# Patient Record
Sex: FEMALE | Race: WHITE | Employment: STUDENT | URBAN - METROPOLITAN AREA
[De-identification: names, ages, dates, MRNs, and addresses within clinical notes are randomized per-mention and may not be internally consistent; named-entity substitution may affect disease eponyms.]

---
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
## 2023-05-16 ENCOUNTER — OFFICE VISIT (OUTPATIENT)
Dept: FAMILY MEDICINE CLINIC | Facility: CLINIC | Age: 18
End: 2023-05-16
Payer: COMMERCIAL

## 2023-05-16 VITALS
SYSTOLIC BLOOD PRESSURE: 110 MMHG | BODY MASS INDEX: 43.4 KG/M2 | DIASTOLIC BLOOD PRESSURE: 80 MMHG | HEIGHT: 69 IN | OXYGEN SATURATION: 99 % | RESPIRATION RATE: 16 BRPM | WEIGHT: 293 LBS | TEMPERATURE: 97 F | HEART RATE: 82 BPM

## 2023-05-16 DIAGNOSIS — H66.002 NON-RECURRENT ACUTE SUPPURATIVE OTITIS MEDIA OF LEFT EAR WITHOUT SPONTANEOUS RUPTURE OF TYMPANIC MEMBRANE: Primary | ICD-10-CM

## 2023-05-16 PROCEDURE — 99213 OFFICE O/P EST LOW 20 MIN: CPT | Performed by: NURSE PRACTITIONER

## 2023-05-16 PROCEDURE — 3008F BODY MASS INDEX DOCD: CPT | Performed by: NURSE PRACTITIONER

## 2023-05-16 PROCEDURE — 3074F SYST BP LT 130 MM HG: CPT | Performed by: NURSE PRACTITIONER

## 2023-05-16 PROCEDURE — 3079F DIAST BP 80-89 MM HG: CPT | Performed by: NURSE PRACTITIONER

## 2023-05-16 RX ORDER — AMOXICILLIN 875 MG/1
875 TABLET, COATED ORAL 2 TIMES DAILY
Qty: 20 TABLET | Refills: 0 | Status: SHIPPED | OUTPATIENT
Start: 2023-05-16 | End: 2023-05-26

## 2023-05-22 NOTE — PROGRESS NOTES
Progress  Summary    Pt has attended 5 visits in Physical Therapy. Subjective: The patient states she is at 55%. She did go sledding the other day and feels she has more pain around her knee.      Assessment: The patient has primarily subjective pain r [phone].     Sincerely,  Electronically signed by therapist: Milo Grace PT    Dx: Left knee patellar tendonitis, PF disorder         Authorized # of Visits:  8         Next MD visit: none scheduled  Fall Risk: standard         Precautions: n/a patella 20 reps      Bridge with blue band above knees 20 reps  Single leg balance airex pad x 5 right and left LE 15-20 seconds Lunge with same side reach medial pull with band 10 x 2 each Bridge 30 reps with isometric hip adduction      Clam shell 30 rep Tissue Cultured Epidermal Autograft Text: The defect edges were debeveled with a #15 scalpel blade.  Given the location of the defect, shape of the defect and the proximity to free margins a tissue cultured epidermal autograft was deemed most appropriate.  The graft was then trimmed to fit the size of the defect.  The graft was then placed in the primary defect and oriented appropriately.

## 2024-03-13 ENCOUNTER — OFFICE VISIT (OUTPATIENT)
Dept: FAMILY MEDICINE CLINIC | Facility: CLINIC | Age: 19
End: 2024-03-13
Payer: COMMERCIAL

## 2024-03-13 VITALS
SYSTOLIC BLOOD PRESSURE: 112 MMHG | OXYGEN SATURATION: 99 % | TEMPERATURE: 98 F | WEIGHT: 281 LBS | RESPIRATION RATE: 16 BRPM | BODY MASS INDEX: 42 KG/M2 | HEART RATE: 71 BPM | DIASTOLIC BLOOD PRESSURE: 82 MMHG

## 2024-03-13 DIAGNOSIS — H66.001 NON-RECURRENT ACUTE SUPPURATIVE OTITIS MEDIA OF RIGHT EAR WITHOUT SPONTANEOUS RUPTURE OF TYMPANIC MEMBRANE: Primary | ICD-10-CM

## 2024-03-13 DIAGNOSIS — J02.9 SORE THROAT: ICD-10-CM

## 2024-03-13 DIAGNOSIS — R09.82 POST-NASAL DRIP: ICD-10-CM

## 2024-03-13 LAB
CONTROL LINE PRESENT WITH A CLEAR BACKGROUND (YES/NO): YES YES/NO
KIT LOT #: NORMAL NUMERIC
STREP GRP A CUL-SCR: NEGATIVE

## 2024-03-13 PROCEDURE — 99213 OFFICE O/P EST LOW 20 MIN: CPT

## 2024-03-13 PROCEDURE — 87880 STREP A ASSAY W/OPTIC: CPT

## 2024-03-13 RX ORDER — AMOXICILLIN 875 MG/1
875 TABLET, COATED ORAL 2 TIMES DAILY
Qty: 20 TABLET | Refills: 0 | Status: SHIPPED | OUTPATIENT
Start: 2024-03-13 | End: 2024-03-23

## 2024-03-13 NOTE — PROGRESS NOTES
CHIEF COMPLAINT:     Chief Complaint   Patient presents with    Sore Throat     S/s for 1 day and R>L ear pain 1 week       HPI:   Rubia Sanders is a 18 year old female who presents for upper respiratory symptoms for  1 weeks. Patient reports sore throat, ear pain, denies fever. Symptoms have been without change since onset.  Treating symptoms with zyrtec on 03/12. Reports ear pain for 1 week and sore throat for 1 day. Denies any known exposures or other members in house sick with similar symptoms.      Current Outpatient Medications   Medication Sig Dispense Refill    metFORMIN  MG Oral Tablet 24 Hr Take 2 tablets (1,000 mg total) by mouth daily. 60 tablet 5    sertraline (ZOLOFT) 50 MG Oral Tab Take 1 tablet (50 mg total) by mouth daily. 90 tablet 0    meclizine 12.5 MG Oral Tab Take 2 tablets (25 mg total) by mouth 3 (three) times daily as needed for Dizziness. 30 tablet 0    VITAMIN D, CHOLECALCIFEROL, OR Take by mouth.          Past Medical History:   Diagnosis Date    Allergic rhinitis       Past Surgical History:   Procedure Laterality Date    TONSILLECTOMY           Social History     Socioeconomic History    Marital status: Single   Tobacco Use    Smoking status: Never    Smokeless tobacco: Never   Vaping Use    Vaping Use: Never used   Substance and Sexual Activity    Alcohol use: No    Drug use: No    Sexual activity: Never   Other Topics Concern    Caffeine Concern Yes     Comment: occ cup of coffee    Exercise No         REVIEW OF SYSTEMS:   GENERAL: no change in appetite  SKIN: no rashes or abnormal skin lesions  HEENT: See HPI  LUNGS: See HPI  CARDIOVASCULAR: denies chest pain or palpitations   GI: denies N/V/C or abdominal pain      EXAM:   /82   Pulse 71   Temp 97.8 °F (36.6 °C) (Oral)   Resp 16   Wt 281 lb (127.5 kg)   LMP 02/19/2024   SpO2 99%   BMI 41.50 kg/m²   GENERAL: well developed, well nourished,in no apparent distress  SKIN: no rashes,no suspicious lesions  HEAD:  atraumatic, normocephalic.  no tenderness on palpation of frontal and maxillary sinuses  EYES: conjunctiva clear, EOM intact  EARS: TM's right erythematous and intact, no bulging, no retraction,right positive fluid, bony landmarks visualized  NOSE: Nostrils patent, no nasal discharge, nasal mucosa non-inflamed   THROAT: Oral mucosa pink, moist. Posterior pharynx is non erythematous. no exudates. Tonsils absent.    NECK: Supple, non-tender  LUNGS: clear to auscultation bilaterally, no wheezes or rhonchi. Breathing is non labored.  CARDIO: RRR without murmur  EXTREMITIES: no cyanosis, clubbing or edema  LYMPH:  no lymphadenopathy.        ASSESSMENT AND PLAN:   Rubia Sanders is a 18 year old female who presents with upper respiratory symptoms that are consistent with    ASSESSMENT:   Encounter Diagnoses   Name Primary?    Sore throat     Non-recurrent acute suppurative otitis media of right ear without spontaneous rupture of tympanic membrane Yes    Post-nasal drip      Results for orders placed or performed in visit on 03/13/24   Rapid Strep    Collection Time: 03/13/24  3:46 PM   Result Value Ref Range    Strep Grp A Screen negative Negative    Control Line Present with a clear background (yes/no) yes Yes/No    Kit Lot # [id number] Numeric    Kit Expiration Date 05/21/2025 Date         PLAN: Meds as below.  Comfort care as described in Patient Instructions. Discussion about supportive treatment including over the counter medications, hydration and rest.     Meds & Refills for this Visit:  Requested Prescriptions     Signed Prescriptions Disp Refills    amoxicillin 875 MG Oral Tab 20 tablet 0     Sig: Take 1 tablet (875 mg total) by mouth 2 (two) times daily for 10 days.     Risks, benefits, and side effects of medication explained and discussed.    The patient indicates understanding of these issues and agrees to the plan.  The patient is asked to f/u with PCP if sx's persist or worsen.

## 2024-10-02 NOTE — PROGRESS NOTES
HPI:   Rubia Sanders is a 18 year old female who presents for a complete physical exam. Symptoms: denies discharge, itching, burning or dysuria, flow is 5-7 days, menses irregular .  Regular SBE- no,Sexually active- no,  Contraception- none. STD history- none.    Pt is here for a recheck of anxiety/depression. Pt stopped the medication last year sometime because she did not feel it was working well for her. 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 Pt states her mind is always thinking about work. Pt can not sleep at night because she is thinking about work. Pt also gets texts night and day from the other managers about different things and it drives her crazy. [employer] is all about communication but she feels they communicate too much. This makes her sad and then she has thoughts of death. Pt states she would not act on her thoughts. Pt feels she is having them because she is stressed out. Pt is considering changing jobs. Pt would like to try a different medication and see a counselor.     Screening:  Immunization History   Administered Date(s) Administered    Covid-19 Vaccine Pfizer 30 mcg/0.3 ml 06/16/2021, 07/07/2021    Covid-19 Vaccine Pfizer Thomas-Sucrose 30 mcg/0.3 ml 01/20/2022    DTAP 05/07/2004, 08/18/2004, 10/14/2004, 08/15/2005    FLULAVAL 6 months & older 0.5 ml Prefilled syringe ([id number]) 11/07/2022    HEP A,Ped/Adol,(2 Dose) 07/31/2018, 02/20/2019    HEP B, Ped/Adol 05/07/2004, 08/18/2004, 02/02/2005    HIB 05/07/2004, 08/18/2004, 10/14/2004, 06/27/2005    Hpv Virus Vaccine 9 Jadyn Im 07/31/2018, 02/20/2019    IPV 05/07/2004, 08/18/2004, 08/15/2005    Influenza 01/28/2018    MMR 06/27/2005, 07/29/2015    Meningococcal-Menveo 2month-55yr 07/29/2015, 08/10/2021    Pneumococcal (Prevnar 13) 10/14/2004, 06/27/2005    Pneumococcal (Prevnar 7) 05/07/2004, 08/18/2004    TDAP 07/29/2015    Varicella 02/02/2005, 07/29/2015      Menarche- 9 yr  PAP-  none  Any abnormal pap smears- n/a  Pregnancies- 0, Live births- 0  Mammogram-  n/a   Any breast cancer- maternal aunt, or any gynecological cancer- none, any cancers none  Labs- 9/21/19  DEXA over 65yr- n/a  Flu shot-  today  Colon- n/a  Glasses/contacts- yes, last exam- 2023  Dental visits- 2/2024    Immunization History   Administered Date(s) Administered    Covid-19 Vaccine Pfizer 30 mcg/0.3 ml 06/16/2021, 07/07/2021    Covid-19 Vaccine Pfizer Thomas-Sucrose 30 mcg/0.3 ml 01/20/2022    DTAP 05/07/2004, 08/18/2004, 10/14/2004, 08/15/2005    FLULAVAL 6 months & older 0.5 ml Prefilled syringe ([id number]) 11/07/2022    HEP A,Ped/Adol,(2 Dose) 07/31/2018, 02/20/2019    HEP B, Ped/Adol 05/07/2004, 08/18/2004, 02/02/2005    HIB 05/07/2004, 08/18/2004, 10/14/2004, 06/27/2005    Hpv Virus Vaccine 9 Jadyn Im 07/31/2018, 02/20/2019    IPV 05/07/2004, 08/18/2004, 08/15/2005    Influenza 01/28/2018    MMR 06/27/2005, 07/29/2015    Meningococcal-Menveo 2month-55yr 07/29/2015, 08/10/2021    Pneumococcal (Prevnar 13) 10/14/2004, 06/27/2005    Pneumococcal (Prevnar 7) 05/07/2004, 08/18/2004    TDAP 07/29/2015    Varicella 02/02/2005, 07/29/2015     Wt Readings from Last 6 Encounters:   10/03/24 287 lb 3.2 oz (130.3 kg)   03/13/24 281 lb (127.5 kg)   05/16/23 (!) 350 lb (158.8 kg) (>99%, Z= 3.02)*   11/07/22 279 lb (126.6 kg) (>99%, Z= 2.66)*   08/30/22 271 lb (122.9 kg) (>99%, Z= 2.60)*   04/26/22 275 lb 9.6 oz (125 kg) (>99%, Z= 2.61)*     * Growth percentiles are based on CDC (Girls, 2-20 Years) data.     Body mass index is 42.41 kg/m².     Lab Results   Component Value Date    GLU 84 01/06/2021    GLU 84 09/21/2019     Lab Results   Component Value Date    CHOLEST 163 09/21/2019     Lab Results   Component Value Date    HDL 50 09/21/2019     Lab Results   Component Value Date     (H) 09/21/2019     Lab Results   Component Value Date    AST 41 (H) 01/06/2021    AST 22 09/21/2019     Lab Results   Component Value Date    ALT 58  (H) 01/06/2021    ALT 37 09/21/2019       Current Outpatient Medications   Medication Sig Dispense Refill    Vitamin D, Cholecalciferol, 50 MCG (2000 UT) Oral Cap Take 2,000 Units by mouth.        Past Medical History:    Allergic rhinitis      Past Surgical History:   Procedure Laterality Date    Tonsillectomy        History reviewed. No pertinent family history.   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   Exercise: minimal.  Diet: watches minimally     REVIEW OF SYSTEMS:   GENERAL: feels well otherwise  SKIN: denies any unusual skin lesions  EYES:denies blurred vision or double vision  HEENT: denies nasal congestion, sinus pain or ST  LUNGS: denies shortness of breath with exertion  CARDIOVASCULAR: denies chest pain on exertion  GI: denies abdominal pain,denies heartburn  : denies dysuria, vaginal discharge or itching,periods regular   MUSCULOSKELETAL: denies back pain, hx of left knee pain  NEURO: denies headaches  PSYCHE: + depression and anxiety. See HPI  HEMATOLOGIC: denies hx of anemia  ENDOCRINE: denies thyroid history  ALL/ASTHMA: denies hx of allergy or asthma    EXAM:   /80   Pulse 82   Temp 97.3 °F (36.3 °C) (Temporal)   Resp 16   Ht 5' 9\" (1.753 m)   Wt 287 lb 3.2 oz (130.3 kg)   LMP 10/01/2024   SpO2 99%   BMI 42.41 kg/m²   Body mass index is 42.41 kg/m².   GENERAL: well developed, well nourished,in no apparent distress  SKIN: no rashes,no suspicious lesions  HEENT: atraumatic, normocephalic,ears and throat are clear  EYES:PERRLA, EOMI, normal optic disk,conjunctiva are clear  NECK: supple,no  adenopathy,no bruits  CHEST: no chest tenderness  BREAST: no dominant or suspicious mass  LUNGS: clear to auscultation  CARDIO: RRR without murmur  GI: good BS's,no masses, HSM or tenderness  :deferred not sexually active  MUSCULOSKELETAL: back is not tender,FROM of the back  EXTREMITIES: no cyanosis, clubbing or edema  NEURO: Oriented times three,cranial nerves are intact,motor and sensory are grossly intact  Psych: Alert, oriented, normal affect without distress. Good eye contact. Appropriate conversational responses to questions. Appropriate dress and body language. Normal judgment and insight. No pressured speech, flight of ideas, hyperactivity or psychomotor hyperactivity or retardation. Denies suicidal ideation. No evidence of hallucinations, delusions or psychosis.     ASSESSMENT AND PLAN:   Rubia Sanders is a 18 year old female who presents for a complete physical exam.  Self breast exam explained. Health maintenance, will check fasting Labs. Pt' s weight is Body mass index is 42.41 kg/m²., recommended low fat diet and aerobic exercise 30 minutes three times weekly.  The patient indicates understanding of these issues and agrees to the plan.  The patient is asked to return for CPX in one year.  1. Routine general medical examination at a health care facility    - CBC With Differential With Platelet; Future  - Comp Metabolic Panel (14); Future  - Lipid Panel; Future  - Hemoglobin A1C; Future  - TSH W Reflex To Free T4; Future  - Vitamin D; Future    2. Anxiety and depression  - Part to start the Lexapro and call with a progress report in 2 weeks,,sooner if needed. Pt also referred to counseling  - escitalopram (LEXAPRO) 10 MG Oral Tab; Take 1 tablet (10 mg total) by mouth daily.  Dispense: 90 tablet; Refill: 0  - [facility] Referral - In Network    3. Need for influenza vaccination    - INFLUENZA VACCINE, TRI, PRESERV FREE, 0.5 ML    Encounter Diagnoses   Name Primary?    Routine general medical  examination at a health care facility Yes    Anxiety and depression        Orders Placed This Encounter   Procedures    CBC With Differential With Platelet    Comp Metabolic Panel (14)    Lipid Panel    Hemoglobin A1C    TSH W Reflex To Free T4    Vitamin D    INFLUENZA VACCINE, TRI, PRESERV FREE, 0.5 ML       Meds & Refills for this Visit:  Requested Prescriptions     Signed Prescriptions Disp Refills    escitalopram (LEXAPRO) 10 MG Oral Tab 90 tablet 0     Sig: Take 1 tablet (10 mg total) by mouth daily.       Imaging & Consults:  INFLUENZA VACCINE, TRI, PRESERV FREE, 0.5 ML  OP REFERRAL TO [facility]

## 2024-10-03 ENCOUNTER — LAB ENCOUNTER (OUTPATIENT)
Dept: LAB | Age: 19
End: 2024-10-03
Attending: FAMILY MEDICINE
Payer: COMMERCIAL

## 2024-10-03 ENCOUNTER — OFFICE VISIT (OUTPATIENT)
Dept: INTERNAL MEDICINE CLINIC | Facility: CLINIC | Age: 19
End: 2024-10-03
Payer: COMMERCIAL

## 2024-10-03 VITALS
OXYGEN SATURATION: 99 % | RESPIRATION RATE: 16 BRPM | TEMPERATURE: 97 F | SYSTOLIC BLOOD PRESSURE: 110 MMHG | HEIGHT: 69 IN | WEIGHT: 287.19 LBS | DIASTOLIC BLOOD PRESSURE: 80 MMHG | HEART RATE: 82 BPM | BODY MASS INDEX: 42.54 KG/M2

## 2024-10-03 DIAGNOSIS — Z00.00 ROUTINE GENERAL MEDICAL EXAMINATION AT A HEALTH CARE FACILITY: ICD-10-CM

## 2024-10-03 DIAGNOSIS — F41.9 ANXIETY AND DEPRESSION: ICD-10-CM

## 2024-10-03 DIAGNOSIS — F32.A ANXIETY AND DEPRESSION: ICD-10-CM

## 2024-10-03 DIAGNOSIS — Z23 NEED FOR INFLUENZA VACCINATION: ICD-10-CM

## 2024-10-03 DIAGNOSIS — Z00.00 ROUTINE GENERAL MEDICAL EXAMINATION AT A HEALTH CARE FACILITY: Primary | ICD-10-CM

## 2024-10-03 LAB
ALBUMIN SERPL-MCNC: 4.4 G/DL (ref 3.2–4.8)
ALBUMIN/GLOB SERPL: 1.3 {RATIO} (ref 1–2)
ALP LIVER SERPL-CCNC: 111 U/L
ALT SERPL-CCNC: 20 U/L
ANION GAP SERPL CALC-SCNC: 4 MMOL/L (ref 0–18)
AST SERPL-CCNC: 25 U/L (ref ?–34)
BASOPHILS # BLD AUTO: 0.04 X10(3) UL (ref 0–0.2)
BASOPHILS NFR BLD AUTO: 0.7 %
BILIRUB SERPL-MCNC: 0.4 MG/DL (ref 0.3–1.2)
BUN BLD-MCNC: 12 MG/DL (ref 9–23)
CALCIUM BLD-MCNC: 10.2 MG/DL (ref 8.7–10.4)
CHLORIDE SERPL-SCNC: 109 MMOL/L (ref 98–112)
CHOLEST SERPL-MCNC: 157 MG/DL (ref ?–200)
CO2 SERPL-SCNC: 21 MMOL/L (ref 21–32)
CREAT BLD-MCNC: 0.65 MG/DL
EGFRCR SERPLBLD CKD-EPI 2021: 129 ML/MIN/1.73M2 (ref 60–?)
EOSINOPHIL # BLD AUTO: 0.04 X10(3) UL (ref 0–0.7)
EOSINOPHIL NFR BLD AUTO: 0.7 %
ERYTHROCYTE [DISTWIDTH] IN BLOOD BY AUTOMATED COUNT: 13 %
EST. AVERAGE GLUCOSE BLD GHB EST-MCNC: 111 MG/DL (ref 68–126)
FASTING PATIENT LIPID ANSWER: YES
FASTING STATUS PATIENT QL REPORTED: YES
GLOBULIN PLAS-MCNC: 3.3 G/DL (ref 2–3.5)
GLUCOSE BLD-MCNC: 86 MG/DL (ref 70–99)
HBA1C MFR BLD: 5.5 % (ref ?–5.7)
HCT VFR BLD AUTO: 38.8 %
HDLC SERPL-MCNC: 52 MG/DL (ref 40–59)
HGB BLD-MCNC: 12.5 G/DL
IMM GRANULOCYTES # BLD AUTO: 0.01 X10(3) UL (ref 0–1)
IMM GRANULOCYTES NFR BLD: 0.2 %
LDLC SERPL CALC-MCNC: 94 MG/DL (ref ?–100)
LYMPHOCYTES # BLD AUTO: 1.52 X10(3) UL (ref 1–4)
LYMPHOCYTES NFR BLD AUTO: 25.9 %
MCH RBC QN AUTO: 27.5 PG (ref 26–34)
MCHC RBC AUTO-ENTMCNC: 32.2 G/DL (ref 31–37)
MCV RBC AUTO: 85.5 FL
MONOCYTES # BLD AUTO: 0.41 X10(3) UL (ref 0.1–1)
MONOCYTES NFR BLD AUTO: 7 %
NEUTROPHILS # BLD AUTO: 3.86 X10 (3) UL (ref 1.5–7.7)
NEUTROPHILS # BLD AUTO: 3.86 X10(3) UL (ref 1.5–7.7)
NEUTROPHILS NFR BLD AUTO: 65.5 %
NONHDLC SERPL-MCNC: 105 MG/DL (ref ?–130)
OSMOLALITY SERPL CALC.SUM OF ELEC: 277 MOSM/KG (ref 275–295)
PLATELET # BLD AUTO: 233 10(3)UL (ref 150–450)
POTASSIUM SERPL-SCNC: 4 MMOL/L (ref 3.5–5.1)
PROT SERPL-MCNC: 7.7 G/DL (ref 5.7–8.2)
RBC # BLD AUTO: 4.54 X10(6)UL
SODIUM SERPL-SCNC: 134 MMOL/L (ref 136–145)
TRIGL SERPL-MCNC: 53 MG/DL (ref 30–149)
TSI SER-ACNC: 2.51 MIU/ML (ref 0.55–4.78)
VIT D+METAB SERPL-MCNC: 22.1 NG/ML (ref 30–100)
VLDLC SERPL CALC-MCNC: 9 MG/DL (ref 0–30)
WBC # BLD AUTO: 5.9 X10(3) UL (ref 4–11)

## 2024-10-03 PROCEDURE — 80053 COMPREHEN METABOLIC PANEL: CPT

## 2024-10-03 PROCEDURE — 90471 IMMUNIZATION ADMIN: CPT | Performed by: FAMILY MEDICINE

## 2024-10-03 PROCEDURE — 90656 IIV3 VACC NO PRSV 0.5 ML IM: CPT | Performed by: FAMILY MEDICINE

## 2024-10-03 PROCEDURE — 99395 PREV VISIT EST AGE 18-39: CPT | Performed by: FAMILY MEDICINE

## 2024-10-03 PROCEDURE — 82306 VITAMIN D 25 HYDROXY: CPT

## 2024-10-03 PROCEDURE — 80061 LIPID PANEL: CPT

## 2024-10-03 PROCEDURE — 99213 OFFICE O/P EST LOW 20 MIN: CPT | Performed by: FAMILY MEDICINE

## 2024-10-03 PROCEDURE — 36415 COLL VENOUS BLD VENIPUNCTURE: CPT

## 2024-10-03 PROCEDURE — 85025 COMPLETE CBC W/AUTO DIFF WBC: CPT

## 2024-10-03 PROCEDURE — 83036 HEMOGLOBIN GLYCOSYLATED A1C: CPT

## 2024-10-03 PROCEDURE — 84443 ASSAY THYROID STIM HORMONE: CPT

## 2024-10-03 RX ORDER — ESCITALOPRAM OXALATE 10 MG/1
10 TABLET ORAL DAILY
Qty: 90 TABLET | Refills: 0 | Status: SHIPPED | OUTPATIENT
Start: 2024-10-03

## 2024-10-21 ENCOUNTER — PATIENT MESSAGE (OUTPATIENT)
Dept: INTERNAL MEDICINE CLINIC | Facility: CLINIC | Age: 19
End: 2024-10-21

## 2024-12-27 DIAGNOSIS — F32.A ANXIETY AND DEPRESSION: ICD-10-CM

## 2024-12-27 DIAGNOSIS — F41.9 ANXIETY AND DEPRESSION: ICD-10-CM

## 2024-12-28 RX ORDER — ESCITALOPRAM OXALATE 10 MG/1
10 TABLET ORAL DAILY
Qty: 30 TABLET | Refills: 2 | Status: SHIPPED | OUTPATIENT
Start: 2024-12-28

## 2025-01-25 ENCOUNTER — HOSPITAL ENCOUNTER (EMERGENCY)
Age: 20
Discharge: HOME | End: 2025-01-25
Attending: EMERGENCY MEDICINE
Payer: COMMERCIAL

## 2025-01-25 VITALS
HEART RATE: 68 BPM | DIASTOLIC BLOOD PRESSURE: 54 MMHG | HEIGHT: 69 IN | OXYGEN SATURATION: 100 % | BODY MASS INDEX: 43.4 KG/M2 | RESPIRATION RATE: 16 BRPM | TEMPERATURE: 97 F | WEIGHT: 293 LBS | SYSTOLIC BLOOD PRESSURE: 108 MMHG

## 2025-01-25 DIAGNOSIS — R55 SYNCOPE, NEAR: Primary | ICD-10-CM

## 2025-01-25 LAB
ALBUMIN SERPL-MCNC: 5.1 G/DL (ref 3.2–4.8)
ALBUMIN/GLOB SERPL: 1.7 {RATIO} (ref 1–2)
ALP LIVER SERPL-CCNC: 113 U/L
ALT SERPL-CCNC: 23 U/L
ANION GAP SERPL CALC-SCNC: 7 MMOL/L (ref 0–18)
AST SERPL-CCNC: 23 U/L (ref ?–34)
B-HCG UR QL: NEGATIVE
BASOPHILS # BLD AUTO: 0.06 X10(3) UL (ref 0–0.2)
BASOPHILS NFR BLD AUTO: 0.8 %
BILIRUB SERPL-MCNC: 0.5 MG/DL (ref 0.3–1.2)
BUN BLD-MCNC: 10 MG/DL (ref 9–23)
CALCIUM BLD-MCNC: 10 MG/DL (ref 8.7–10.6)
CHLORIDE SERPL-SCNC: 109 MMOL/L (ref 98–112)
CO2 SERPL-SCNC: 24 MMOL/L (ref 21–32)
CREAT BLD-MCNC: 0.68 MG/DL
EGFRCR SERPLBLD CKD-EPI 2021: 127 ML/MIN/1.73M2 (ref 60–?)
EOSINOPHIL # BLD AUTO: 0.03 X10(3) UL (ref 0–0.7)
EOSINOPHIL NFR BLD AUTO: 0.4 %
ERYTHROCYTE [DISTWIDTH] IN BLOOD BY AUTOMATED COUNT: 12.9 %
GLOBULIN PLAS-MCNC: 3 G/DL (ref 2–3.5)
GLUCOSE BLD-MCNC: 96 MG/DL (ref 70–99)
HCT VFR BLD AUTO: 37.7 %
HGB BLD-MCNC: 13 G/DL
IMM GRANULOCYTES # BLD AUTO: 0.02 X10(3) UL (ref 0–1)
IMM GRANULOCYTES NFR BLD: 0.3 %
LYMPHOCYTES # BLD AUTO: 1.62 X10(3) UL (ref 1–4)
LYMPHOCYTES NFR BLD AUTO: 22.3 %
MCH RBC QN AUTO: 28.1 PG (ref 26–34)
MCHC RBC AUTO-ENTMCNC: 34.5 G/DL (ref 31–37)
MCV RBC AUTO: 81.6 FL
MONOCYTES # BLD AUTO: 0.41 X10(3) UL (ref 0.1–1)
MONOCYTES NFR BLD AUTO: 5.7 %
NEUTROPHILS # BLD AUTO: 5.11 X10 (3) UL (ref 1.5–7.7)
NEUTROPHILS # BLD AUTO: 5.11 X10(3) UL (ref 1.5–7.7)
NEUTROPHILS NFR BLD AUTO: 70.5 %
OSMOLALITY SERPL CALC.SUM OF ELEC: 289 MOSM/KG (ref 275–295)
PLATELET # BLD AUTO: 253 10(3)UL (ref 150–450)
POTASSIUM SERPL-SCNC: 3.7 MMOL/L (ref 3.5–5.1)
PROT SERPL-MCNC: 8.1 G/DL (ref 5.7–8.2)
RBC # BLD AUTO: 4.62 X10(6)UL
SODIUM SERPL-SCNC: 140 MMOL/L (ref 136–145)
TROPONIN I SERPL HS-MCNC: <3 NG/L
WBC # BLD AUTO: 7.3 X10(3) UL (ref 4–11)

## 2025-01-25 PROCEDURE — 93010 ELECTROCARDIOGRAM REPORT: CPT

## 2025-01-25 PROCEDURE — 80053 COMPREHEN METABOLIC PANEL: CPT | Performed by: EMERGENCY MEDICINE

## 2025-01-25 PROCEDURE — 93005 ELECTROCARDIOGRAM TRACING: CPT

## 2025-01-25 PROCEDURE — 85025 COMPLETE CBC W/AUTO DIFF WBC: CPT | Performed by: EMERGENCY MEDICINE

## 2025-01-25 PROCEDURE — 96360 HYDRATION IV INFUSION INIT: CPT

## 2025-01-25 PROCEDURE — 84484 ASSAY OF TROPONIN QUANT: CPT | Performed by: EMERGENCY MEDICINE

## 2025-01-25 PROCEDURE — 99284 EMERGENCY DEPT VISIT MOD MDM: CPT

## 2025-01-25 PROCEDURE — 81025 URINE PREGNANCY TEST: CPT

## 2025-01-25 NOTE — ED PROVIDER NOTES
Patient Seen in: ward Emergency Department In [place]      History     Chief Complaint   Patient presents with    Arrythmia/Palpitations     Stated Complaint: heart racing-     Subjective:   HPI      Patient is a 19-year-old female who had a near syncopal episode at work today.  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  Did not fall or lose consciousness.  Feels a little bit better now but still little weak.  She started on new medication Vraylar about a week and a half ago but has not had any symptoms like this until today.  She did eat and drink normally this morning and for lunch.  No recent illnesses.    Objective:     Past Medical History:    Allergic rhinitis    Anxiety    Depression              Past Surgical History:   Procedure Laterality Date    Tonsillectomy                  Social History     Socioeconomic History    Marital status: Single   Tobacco Use    Smoking status: Never    Smokeless tobacco: Never   Vaping Use    Vaping status: Never Used   Substance and Sexual Activity    Alcohol use: Yes     Comment: soc.    Drug use: No    Sexual activity: Never   Other Topics Concern    Caffeine Concern Yes     Comment: occ cup of coffee    Exercise No                  Physical Exam     ED Triage Vitals [01/25/25 1403]   /40   Pulse 81   Resp 16   Temp 97.2 °F (36.2 °C)   Temp src Temporal   SpO2 98 %   O2 Device None (Room air)       Current Vitals:   Vital Signs  BP: 101/65  Pulse: 106  Resp: 16  Temp: 97.2 °F (36.2 °C)  Temp src: Temporal    Oxygen Therapy  SpO2: 98 %  O2 Device: None (Room air)        Physical Exam  Vitals and nursing note reviewed.   Constitutional:       Appearance: She is well-developed.   HENT:      Head: Normocephalic and atraumatic.   Eyes:      Conjunctiva/sclera: Conjunctivae normal.      Pupils: Pupils are equal, round, and  reactive to light.   Cardiovascular:      Rate and Rhythm: Normal rate and regular rhythm.      Heart sounds: Normal heart sounds.   Pulmonary:      Effort: Pulmonary effort is normal.      Breath sounds: Normal breath sounds.   Abdominal:      General: Bowel sounds are normal.      Palpations: Abdomen is soft.   Musculoskeletal:         General: Normal range of motion.      Cervical back: Normal range of motion and neck supple.   Skin:     General: Skin is warm and dry.   Neurological:      Mental Status: She is alert and oriented to person, place, and time.             ED Course     Labs Reviewed   COMP METABOLIC PANEL (14) - Abnormal; Notable for the following components:       Result Value    Albumin 5.1 (*)     All other components within normal limits   TROPONIN I HIGH SENSITIVITY - Normal   POCT PREGNANCY URINE - Normal   CBC WITH DIFFERENTIAL WITH PLATELET   RAINBOW DRAW LAVENDER   RAINBOW DRAW LIGHT GREEN   RAINBOW DRAW BLUE     EKG    Rate, intervals and axes as noted on EKG Report.  Rate: 83  Rhythm: Sinus Rhythm  Reading: Sinus rhythm.  No ST segment or T wave changes.  No old available for comparison.                       MDM      19-year-old female had what sounds a presyncope at work today.  Sounds most like orthostatic hypotension with lightheadedness/dizziness, nausea.  Well-appearing here, states she still feels little weak but no distress.  EKG is unremarkable.  Labs ordered to evaluate for anemia, electrolyte abnormality, dehydration.  Will hydrate her.    Update at 3:10 PM.  Labs are unremarkable.  Patient comfortable in the room.  While she was not orthostatic her heart rate did go up significantly when she went from sitting to standing so we will finish IV fluids here but I think she can be discharged home after that.      Past Medical History-none    Differential diagnosis before testing included anemia, dehydration, electrolyte abnormality    Co-morbidities that add to the complexity of  management include: None    Testing ordered during this visit included labs        Disposition:        Discharge  I have discussed with the patient the results of test, differential diagnosis, treatment plan, warning signs and symptoms which should prompt immediate return.  They expressed understanding of these instructions and agrees to the following plan provided.  They were given written discharge instructions and agrees to return for any concerns and voiced understanding and all questions were answered.      Medical Decision Making      Disposition and Plan     Clinical Impression:  1. Syncope, near         Disposition:  Discharge  1/25/2025  3:17 pm    Follow-up:  Juan Luis Nassar MD  [address] [address]  [phone]    Follow up            Medications Prescribed:  Current Discharge Medication List              Supplementary Documentation:

## 2025-01-25 NOTE — ED INITIAL ASSESSMENT (HPI)
Felt heart racing and shaky while at work, felt like vomiting, feels a little nauseated now, no recent illness, started Vraylar on 1/15/25

## 2025-01-26 LAB
ATRIAL RATE: 83 BPM
P AXIS: -9 DEGREES
P-R INTERVAL: 128 MS
Q-T INTERVAL: 378 MS
QRS DURATION: 88 MS
QTC CALCULATION (BEZET): 444 MS
R AXIS: 57 DEGREES
T AXIS: 26 DEGREES
VENTRICULAR RATE: 83 BPM

## 2025-04-05 DIAGNOSIS — F32.A ANXIETY AND DEPRESSION: ICD-10-CM

## 2025-04-05 DIAGNOSIS — F41.9 ANXIETY AND DEPRESSION: ICD-10-CM

## 2025-04-05 DIAGNOSIS — R10.2 PELVIC PAIN: ICD-10-CM

## 2025-04-06 RX ORDER — ESCITALOPRAM OXALATE 10 MG/1
10 TABLET ORAL DAILY
Qty: 30 TABLET | Refills: 2 | Status: SHIPPED | OUTPATIENT
Start: 2025-04-06

## 2025-04-06 RX ORDER — NORETHINDRONE ACETATE AND ETHINYL ESTRADIOL 1.5-30(21)
1 KIT ORAL DAILY
Qty: 28 TABLET | Refills: 2 | Status: SHIPPED | OUTPATIENT
Start: 2025-04-06 | End: 2026-04-06

## 2025-06-26 DIAGNOSIS — R10.2 PELVIC PAIN: ICD-10-CM

## 2025-06-26 RX ORDER — NORETHINDRONE ACETATE AND ETHINYL ESTRADIOL AND FERROUS FUMARATE 1.5-30(21)
1 KIT ORAL DAILY
Qty: 84 TABLET | Refills: 0 | Status: SHIPPED | OUTPATIENT
Start: 2025-06-26

## 2025-06-26 NOTE — TELEPHONE ENCOUNTER
Loestrin FE    Gynecology Medication Protocol Failed 06/26/2025 12:09 AM   Protocol Details PASS-PENDING LAST PAP WNL--VIA MANUAL LOOKUP      Filled 4-6-25  Qty 28  2 refills  Future Appointments   Date Time Provider Department Center   7/21/2025  2:00 PM Suzette Sinclair MD [facility]   LOV 10-3-24 [facility]

## 2025-08-27 ENCOUNTER — TELEPHONE (OUTPATIENT)
Dept: INTERNAL MEDICINE CLINIC | Facility: CLINIC | Age: 20
End: 2025-08-27

## 2025-08-28 ENCOUNTER — OFFICE VISIT (OUTPATIENT)
Dept: INTERNAL MEDICINE CLINIC | Facility: CLINIC | Age: 20
End: 2025-08-28

## 2025-08-28 VITALS
HEART RATE: 88 BPM | RESPIRATION RATE: 18 BRPM | BODY MASS INDEX: 43.4 KG/M2 | WEIGHT: 293 LBS | OXYGEN SATURATION: 97 % | DIASTOLIC BLOOD PRESSURE: 78 MMHG | SYSTOLIC BLOOD PRESSURE: 118 MMHG | TEMPERATURE: 98 F | HEIGHT: 69 IN

## 2025-08-28 DIAGNOSIS — J01.90 ACUTE NON-RECURRENT SINUSITIS, UNSPECIFIED LOCATION: Primary | ICD-10-CM

## 2025-08-28 PROCEDURE — 99213 OFFICE O/P EST LOW 20 MIN: CPT | Performed by: FAMILY MEDICINE

## 2025-08-28 RX ORDER — METHYLPREDNISOLONE 4 MG/1
TABLET ORAL
Qty: 1 EACH | Refills: 0 | Status: SHIPPED | OUTPATIENT
Start: 2025-08-28

## (undated) DIAGNOSIS — S83.005A DISLOCATION OF LEFT PATELLA, INITIAL ENCOUNTER: Primary | ICD-10-CM

## (undated) NOTE — LETTER
Patient Name: Petros Stallings  YOB: 2005          MRN :  [id number]  Date:  3/22/2022  Referring Physician:

## (undated) NOTE — LETTER
Patient Name: Juan C Baron  YOB: 2005          MRN number:  [id number]  Date:  3/7/2018  Referring Physician:  Allan Rosario     Discharge Summary    Pt has attended 8 visits in Physical Therapy. Subjective:  The patient states she and follow up with physician if pain complaints do not fully resolve. Thank you for your referral. If you have any questions, please contact me at Dept: [phone].     Sincerely,  Electronically signed by therapist: John Mares PT

## (undated) NOTE — LETTER
Date: 1/11/2018    Patient Name: Andrea Moreau          To Whom it may concern: This letter has been written at the patient's request. The above patient was seen at the [facility] for treatment of a medical condition.     The patient may

## (undated) NOTE — LETTER
January 25, 2025    Patient: Rubia Sanders   Date of Visit: 1/25/2025       To Whom It May Concern:    Rubia Sanders was seen and treated in our emergency department on 1/25/2025. She can return to work with these limitations: on 1/27 .    If you have any questions or concerns, please don't hesitate to call.       Encounter Provider(s):    Stevie Fairbanks MD

## (undated) NOTE — LETTER
Patient Name: Kath Walker  YOB: 2005          MRN number:  [id number]  Date:  2/12/2018  Referring Physician:  Halina Cheadle     Progress  Summary    Pt has attended 5 visits in Physical Therapy. Subjective:  The patient states she treatment options and has agreed to actively participate in planning and for this course of care. Thank you for your referral. If you have any questions, please contact me at Dept: [phone].     Sincerely,  Electronically signed by therapist: Lisa Banks

## (undated) NOTE — LETTER
Date: 11/7/2022    Patient Name: Lacey Isaac          To Whom it may concern: This letter has been written at the patient's request. The above patient was seen at the [facility] for treatment of a medical condition. This patient should be excused from attending work on 11/5/22 and 11/6/22. The patient may return to work on 11/9/22 with the following limitations none.         Sincerely,            Sinclair Lesch, APRN

## (undated) NOTE — LETTER
Date: 2/28/2022    Patient Name: Nils Viera          To Whom it may concern: This letter has been written at the patient's request. The above patient was seen at the [facility] for treatment of a medical condition. The patient may return to school on 2/28/2022. Please excuse her for the missed classes she had due to her appointment    If you require any additional information, please contact our office. Sincerely,        Kaila Berry. Julien Teixeira MD  Knee, Shoulder, & Elbow Surgery / Sports Medicine Specialist  [facility]  [address] [place] [address]. [email or web address]  t: [phone]  o: [phone]  f: [phone]

## (undated) NOTE — LETTER
Date & Time: 1/25/2025, 3:53 PM  Patient: Rubia Sanders  Encounter Provider(s):    Stevie Fairbanks MD       To Whom It May Concern:    Rubia Sanders was seen and treated in our department on 1/25/2025. She can return to work on 01/27/2025.    If you have any questions or concerns, please do not hesitate to call.        _____________________________  Physician/APC Signature